# Patient Record
Sex: FEMALE | Race: OTHER | NOT HISPANIC OR LATINO | URBAN - METROPOLITAN AREA
[De-identification: names, ages, dates, MRNs, and addresses within clinical notes are randomized per-mention and may not be internally consistent; named-entity substitution may affect disease eponyms.]

---

## 2023-05-21 ENCOUNTER — EMERGENCY (EMERGENCY)
Facility: HOSPITAL | Age: 23
LOS: 1 days | Discharge: HOME | End: 2023-05-21
Attending: EMERGENCY MEDICINE | Admitting: EMERGENCY MEDICINE
Payer: COMMERCIAL

## 2023-05-21 VITALS
TEMPERATURE: 99 F | RESPIRATION RATE: 20 BRPM | HEART RATE: 101 BPM | OXYGEN SATURATION: 99 % | DIASTOLIC BLOOD PRESSURE: 66 MMHG | SYSTOLIC BLOOD PRESSURE: 111 MMHG

## 2023-05-21 LAB
ALBUMIN SERPL ELPH-MCNC: 4.2 G/DL — SIGNIFICANT CHANGE UP (ref 3.3–5)
ALP SERPL-CCNC: 63 U/L — SIGNIFICANT CHANGE UP (ref 40–120)
ALT FLD-CCNC: 21 U/L — SIGNIFICANT CHANGE UP (ref 4–33)
ANION GAP SERPL CALC-SCNC: 13 MMOL/L — SIGNIFICANT CHANGE UP (ref 7–14)
APPEARANCE UR: CLEAR — SIGNIFICANT CHANGE UP
AST SERPL-CCNC: 21 U/L — SIGNIFICANT CHANGE UP (ref 4–32)
BACTERIA # UR AUTO: NEGATIVE — SIGNIFICANT CHANGE UP
BASOPHILS # BLD AUTO: 0.02 K/UL — SIGNIFICANT CHANGE UP (ref 0–0.2)
BASOPHILS NFR BLD AUTO: 0.3 % — SIGNIFICANT CHANGE UP (ref 0–2)
BILIRUB SERPL-MCNC: <0.2 MG/DL — SIGNIFICANT CHANGE UP (ref 0.2–1.2)
BILIRUB UR-MCNC: NEGATIVE — SIGNIFICANT CHANGE UP
BLOOD GAS VENOUS COMPREHENSIVE RESULT: SIGNIFICANT CHANGE UP
BUN SERPL-MCNC: 10 MG/DL — SIGNIFICANT CHANGE UP (ref 7–23)
CALCIUM SERPL-MCNC: 9.5 MG/DL — SIGNIFICANT CHANGE UP (ref 8.4–10.5)
CHLORIDE SERPL-SCNC: 106 MMOL/L — SIGNIFICANT CHANGE UP (ref 98–107)
CO2 SERPL-SCNC: 19 MMOL/L — LOW (ref 22–31)
COLOR SPEC: YELLOW — SIGNIFICANT CHANGE UP
CREAT SERPL-MCNC: 0.47 MG/DL — LOW (ref 0.5–1.3)
DIFF PNL FLD: NEGATIVE — SIGNIFICANT CHANGE UP
EGFR: 137 ML/MIN/1.73M2 — SIGNIFICANT CHANGE UP
EOSINOPHIL # BLD AUTO: 0.09 K/UL — SIGNIFICANT CHANGE UP (ref 0–0.5)
EOSINOPHIL NFR BLD AUTO: 1.2 % — SIGNIFICANT CHANGE UP (ref 0–6)
EPI CELLS # UR: 7 /HPF — HIGH (ref 0–5)
GLUCOSE SERPL-MCNC: 125 MG/DL — HIGH (ref 70–99)
GLUCOSE UR QL: NEGATIVE — SIGNIFICANT CHANGE UP
HCG SERPL-ACNC: <1 MIU/ML — SIGNIFICANT CHANGE UP
HCT VFR BLD CALC: 35.9 % — SIGNIFICANT CHANGE UP (ref 34.5–45)
HGB BLD-MCNC: 11.4 G/DL — LOW (ref 11.5–15.5)
HYALINE CASTS # UR AUTO: 0 /LPF — SIGNIFICANT CHANGE UP (ref 0–7)
IANC: 5.86 K/UL — SIGNIFICANT CHANGE UP (ref 1.8–7.4)
IMM GRANULOCYTES NFR BLD AUTO: 0.3 % — SIGNIFICANT CHANGE UP (ref 0–0.9)
KETONES UR-MCNC: ABNORMAL
LEUKOCYTE ESTERASE UR-ACNC: ABNORMAL
LIDOCAIN IGE QN: 27 U/L — SIGNIFICANT CHANGE UP (ref 7–60)
LYMPHOCYTES # BLD AUTO: 1.12 K/UL — SIGNIFICANT CHANGE UP (ref 1–3.3)
LYMPHOCYTES # BLD AUTO: 14.8 % — SIGNIFICANT CHANGE UP (ref 13–44)
MCHC RBC-ENTMCNC: 26.6 PG — LOW (ref 27–34)
MCHC RBC-ENTMCNC: 31.8 GM/DL — LOW (ref 32–36)
MCV RBC AUTO: 83.9 FL — SIGNIFICANT CHANGE UP (ref 80–100)
MONOCYTES # BLD AUTO: 0.46 K/UL — SIGNIFICANT CHANGE UP (ref 0–0.9)
MONOCYTES NFR BLD AUTO: 6.1 % — SIGNIFICANT CHANGE UP (ref 2–14)
NEUTROPHILS # BLD AUTO: 5.86 K/UL — SIGNIFICANT CHANGE UP (ref 1.8–7.4)
NEUTROPHILS NFR BLD AUTO: 77.3 % — HIGH (ref 43–77)
NITRITE UR-MCNC: NEGATIVE — SIGNIFICANT CHANGE UP
NRBC # BLD: 0 /100 WBCS — SIGNIFICANT CHANGE UP (ref 0–0)
NRBC # FLD: 0 K/UL — SIGNIFICANT CHANGE UP (ref 0–0)
PH UR: 7.5 — SIGNIFICANT CHANGE UP (ref 5–8)
PLATELET # BLD AUTO: 240 K/UL — SIGNIFICANT CHANGE UP (ref 150–400)
POTASSIUM SERPL-MCNC: 3.7 MMOL/L — SIGNIFICANT CHANGE UP (ref 3.5–5.3)
POTASSIUM SERPL-SCNC: 3.7 MMOL/L — SIGNIFICANT CHANGE UP (ref 3.5–5.3)
PROT SERPL-MCNC: 7.1 G/DL — SIGNIFICANT CHANGE UP (ref 6–8.3)
PROT UR-MCNC: ABNORMAL
RBC # BLD: 4.28 M/UL — SIGNIFICANT CHANGE UP (ref 3.8–5.2)
RBC # FLD: 12.4 % — SIGNIFICANT CHANGE UP (ref 10.3–14.5)
RBC CASTS # UR COMP ASSIST: 2 /HPF — SIGNIFICANT CHANGE UP (ref 0–4)
SODIUM SERPL-SCNC: 138 MMOL/L — SIGNIFICANT CHANGE UP (ref 135–145)
SP GR SPEC: 1.03 — SIGNIFICANT CHANGE UP (ref 1.01–1.05)
UROBILINOGEN FLD QL: SIGNIFICANT CHANGE UP
WBC # BLD: 7.57 K/UL — SIGNIFICANT CHANGE UP (ref 3.8–10.5)
WBC # FLD AUTO: 7.57 K/UL — SIGNIFICANT CHANGE UP (ref 3.8–10.5)
WBC UR QL: 25 /HPF — HIGH (ref 0–5)

## 2023-05-21 PROCEDURE — 99285 EMERGENCY DEPT VISIT HI MDM: CPT

## 2023-05-21 PROCEDURE — 71046 X-RAY EXAM CHEST 2 VIEWS: CPT | Mod: 26

## 2023-05-21 PROCEDURE — 76705 ECHO EXAM OF ABDOMEN: CPT | Mod: 26

## 2023-05-21 RX ORDER — KETOROLAC TROMETHAMINE 30 MG/ML
15 SYRINGE (ML) INJECTION ONCE
Refills: 0 | Status: DISCONTINUED | OUTPATIENT
Start: 2023-05-21 | End: 2023-05-21

## 2023-05-21 RX ORDER — FAMOTIDINE 10 MG/ML
20 INJECTION INTRAVENOUS ONCE
Refills: 0 | Status: COMPLETED | OUTPATIENT
Start: 2023-05-21 | End: 2023-05-21

## 2023-05-21 RX ORDER — ACETAMINOPHEN 500 MG
650 TABLET ORAL ONCE
Refills: 0 | Status: COMPLETED | OUTPATIENT
Start: 2023-05-21 | End: 2023-05-21

## 2023-05-21 RX ORDER — SODIUM CHLORIDE 9 MG/ML
1000 INJECTION INTRAMUSCULAR; INTRAVENOUS; SUBCUTANEOUS ONCE
Refills: 0 | Status: COMPLETED | OUTPATIENT
Start: 2023-05-21 | End: 2023-05-21

## 2023-05-21 RX ORDER — FAMOTIDINE 10 MG/ML
20 INJECTION INTRAVENOUS DAILY
Refills: 0 | Status: DISCONTINUED | OUTPATIENT
Start: 2023-05-21 | End: 2023-05-21

## 2023-05-21 RX ADMIN — Medication 650 MILLIGRAM(S): at 21:08

## 2023-05-21 RX ADMIN — FAMOTIDINE 20 MILLIGRAM(S): 10 INJECTION INTRAVENOUS at 21:08

## 2023-05-21 RX ADMIN — Medication 15 MILLIGRAM(S): at 21:08

## 2023-05-21 RX ADMIN — SODIUM CHLORIDE 1000 MILLILITER(S): 9 INJECTION INTRAMUSCULAR; INTRAVENOUS; SUBCUTANEOUS at 22:30

## 2023-05-21 NOTE — ED PROVIDER NOTE - ATTENDING CONTRIBUTION TO CARE
I performed a face-to-face evaluation of the patient and performed a history and physical examination. I agree with the history and physical examination. If this was a PA visit, I personally saw the patient with the PA and performed a substantive portion of the visit including all aspects of the medical decision making.      Sharp upper abd pain x 1 day, worse w/ food. Similar 2 days ago after eating. Concern for PUD vs. biliary colic. No other GI Sx. No  Sx. Check labs, lipase, RUQ US.

## 2023-05-21 NOTE — ED ADULT TRIAGE NOTE - CHIEF COMPLAINT QUOTE
Pt AOX4 c/o intense sharp abdominal pain 10/10, started  this morning  around 11:00 and has only increased, pt visibly uncomfortable in triage, writhing, moaning, crying, cannot sit in one position, is guarding abdomen, no vomiting, no diarrhea, no surgical history, no PMHx; LMP 5/1/23

## 2023-05-21 NOTE — ED PROVIDER NOTE - PROGRESS NOTE DETAILS
Marisa Mcnulty MD (PGY-1 EM): patient states she feels improved with medicine. clinically looks improved. awaiting US. Marisa Mcnulty MD (PGY-1 EM): patient can tolerate PO. Discussed with patient US results. Awaiting surgery consult. Marisa Mcnulty MD (PGY-1 EM): patient spoke with surgery and decided to follow up with them as an outpatient.

## 2023-05-21 NOTE — ED ADULT NURSE NOTE - NSFALLUNIVINTERV_ED_ALL_ED
Bed/Stretcher in lowest position, wheels locked, appropriate side rails in place/Call bell, personal items and telephone in reach/Instruct patient to call for assistance before getting out of bed/chair/stretcher/Non-slip footwear applied when patient is off stretcher/[place] to call system/Physically safe environment - no spills, clutter or unnecessary equipment/Purposeful proactive rounding/Room/bathroom lighting operational, light cord in reach
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
## 2023-05-21 NOTE — ED PROVIDER NOTE - CLINICAL SUMMARY MEDICAL DECISION MAKING FREE TEXT BOX
Action 1: Continue Detail Level: Simple Continue Regimen: Cimzia 200mg every 2 weeks, Sorilux foam bid Chapo: Sharp upper abd pain x 1 day, worse w/ food. Similar 2 days ago after eating. Concern for PUD vs. biliary colic. No other GI Sx. No  Sx. Check labs, lipase, RUQ US.

## 2023-05-21 NOTE — ED PROVIDER NOTE - NSFOLLOWUPINSTRUCTIONS_ED_ALL_ED_FT
Gallstones    Gallstones (cholelithiasis) is a form of gallbladder disease in which stones form in your gallbladder. The gallbladder is an organ that stores bile made in the liver, which helps digest fats. Gallstones begin as small bile crystals and slowly grow into stones. Gallstone pain occurs when the gallbladder spasms and a gallstone or sludge is blocking the duct. Pain can also occur when a stone passes out of the duct. Only take over-the-counter or prescription medicines for pain, discomfort, or fever as directed by your health care provider. Follow a low-fat diet until seen again by your health care provider.     SEEK IMMEDIATE MEDICAL CARE IF YOU HAVE ANY OF THE FOLLOWING SYMPTOMS: worsening pain, fever, persistent vomiting, yellowing of the skin or eyes, or altered mental status.    you spoke with surgery and decided to follow up with them as an outpatient.     please follow up with Dr. Nuno within the week. Gallstones    Gallstones (cholelithiasis) is a form of gallbladder disease in which stones form in your gallbladder. The gallbladder is an organ that stores bile made in the liver, which helps digest fats. Gallstones begin as small bile crystals and slowly grow into stones. Gallstone pain occurs when the gallbladder spasms and a gallstone or sludge is blocking the duct. Pain can also occur when a stone passes out of the duct. Only take over-the-counter or prescription medicines for pain, discomfort, or fever as directed by your health care provider. Follow a low-fat diet until seen again by your health care provider.     SEEK IMMEDIATE MEDICAL CARE IF YOU HAVE ANY OF THE FOLLOWING SYMPTOMS: worsening pain, fever, persistent vomiting, yellowing of the skin or eyes, or altered mental status.    you spoke with surgery and decided to follow up with them as an outpatient.     please follow up with Dr. Nuno within the week.    Dr. Gerard Nuno  [id number],   [place] [address]  Phone: [phone]

## 2023-05-21 NOTE — ED PROVIDER NOTE - CARE PROVIDER_API CALL
Gerard Nuno)  Surgery  [address] [place] [address]  Phone: [phone]  Fax: [phone]  Follow Up Time:

## 2023-05-21 NOTE — ED PROVIDER NOTE - PATIENT PORTAL LINK FT
You can access the FollowMyHealth Patient Portal offered by [facility] by registering at the following website: [email or web address]. By joining [employer]’s FollowMyHealth portal, you will also be able to view your health information using other applications (apps) compatible with our system.

## 2023-05-21 NOTE — ED ADULT NURSE NOTE - OBJECTIVE STATEMENT
Patient received to room 9. Patient is a&ox4 ambulatory at baseline. Patient admits after eating fatty food, patient had extreme abdominal pain. Patient denies chest painsob n/v. Patient denies any intercourse. No significant past medical history. Patient on palpation has tenderness to upper right quadrant. Patient awaiting for results.

## 2023-05-21 NOTE — ED PROVIDER NOTE - PHYSICAL EXAMINATION
PHYSICAL EXAM:  CONSTITUTIONAL: appearing, awake, alert, oriented to person, place, time/situation and in mild pain, non toxic.   HEAD: Atraumatic  EYES: Clear bilaterally, pupils equal, round and reactive to light.  ENMT: Airway patent, Nasal mucosa clear. Mouth with normal mucosa. Uvula is midline.   CARDIAC: Normal rate, regular rhythm. +S1/S2. No murmurs, rubs or gallops.  RESPIRATORY: Breathing unlabored. Breath sounds clear and equal bilaterally.  ABDOMEN:  Soft, tender RUQ, nondistended. No rebound tenderness or guarding.  NEUROLOGICAL: Alert and oriented, no focal deficits, no motor or sensory deficits. CN2-12 intact. Sensation intact x4 extremities.  SKIN: Skin warm and dry. No evidence of rashes or lesions. PHYSICAL EXAM:  CONSTITUTIONAL: appearing, awake, alert, oriented to person, place, time/situation and in mild pain, non toxic.   HEAD: Atraumatic  EYES: Clear bilaterally, pupils equal, round and reactive to light.  ENMT: Airway patent, Nasal mucosa clear. Mouth with normal mucosa. Uvula is midline.   CARDIAC: Normal rate, regular rhythm. +S1/S2. No murmurs, rubs or gallops.  RESPIRATORY: Breathing unlabored. Breath sounds clear and equal bilaterally.  ABDOMEN:  Soft, tender RUQ, nondistended. No rebound tenderness or guarding.  NEUROLOGICAL: Alert and oriented, no focal deficits, no motor or sensory deficits. CN2-12 intact. Sensation intact x4 extremities.  SKIN: Skin warm and dry. No evidence of rashes or lesions, old linear cuts anterior forearm.

## 2023-05-22 VITALS
TEMPERATURE: 98 F | SYSTOLIC BLOOD PRESSURE: 98 MMHG | OXYGEN SATURATION: 100 % | RESPIRATION RATE: 16 BRPM | HEART RATE: 98 BPM | DIASTOLIC BLOOD PRESSURE: 68 MMHG

## 2023-05-22 LAB — BLOOD GAS VENOUS COMPREHENSIVE RESULT: SIGNIFICANT CHANGE UP

## 2023-05-22 NOTE — ED ADULT NURSE REASSESSMENT NOTE - NS ED NURSE REASSESS COMMENT FT1
Break RN: Pt is A&Ox4, resting in stretcher with no complaints at this time. Respirations even and unlabored, chest rise equal b/l. NSR on cardiac monitor. VS as noted in flow sheets. Pt denies chest pain, SOB, fever, cough, chills, abdominal pain, N/V/D or any urinary symptoms at this time. NAD noted. IV removed. Safety maintained throughout. Will continue to monitor.
Patient A&Ox4, respirations even and unlabored. States improvement in condition. Vitals stable. Results back, awaiting dispo. Stretcher in lowest position, wheels locked, appropriate side rails in place.

## 2023-05-22 NOTE — CONSULT NOTE ADULT - SUBJECTIVE AND OBJECTIVE BOX
GENERAL SURGERY CONSULT NOTE  HPI:        PAST MEDICAL & SURGICAL HISTORY:      MEDICATIONS  (STANDING):    MEDICATIONS  (PRN):  aluminum hydroxide/magnesium hydroxide/simethicone Suspension 30 milliLiter(s) Oral once PRN Dyspepsia      Allergies    No Known Allergies    Intolerances        SOCIAL HISTORY: Denies tobacco, EtOH, other drug use.         PHYSICAL EXAM:  GENERAL: NAD, well-groomed, well-developed  HEAD:  Atraumatic, Normocephalic  EYES: EOMI, conjunctiva and sclera clear  ENMT: Moist mucous membranes  NECK: Supple, No JVD, Normal thyroid  HEART: Regular rate and rhythm  RESPIRATORY: Nonlabored breathing on RA  ABDOMEN: Soft, Nondistended, tender to palpation in RUQ  NEUROLOGY: A&Ox3, nonfocal, moving all extremities  SKIN: warm, dry, normal color, no rash or abnormal lesions        Vital Signs Last 24 Hrs  T(C): 36.3 (22 May 2023 00:08), Max: 37.4 (21 May 2023 20:43)  T(F): 97.3 (22 May 2023 00:08), Max: 99.3 (21 May 2023 20:43)  HR: 96 (22 May 2023 00:08) (88 - 101)  BP: 109/78 (22 May 2023 00:08) (104/88 - 111/66)  BP(mean): --  RR: 16 (22 May 2023 00:08) (16 - 20)  SpO2: 100% (22 May 2023 00:08) (99% - 100%)    Parameters below as of 22 May 2023 00:08  Patient On (Oxygen Delivery Method): room air        I&O's Summary          LABS:                        11.4   7.57  )-----------( 240      ( 21 May 2023 20:32 )             35.9     05-21    138  |  106  |  10  ----------------------------<  125<H>  3.7   |  19<L>  |  0.47<L>    Ca    9.5      21 May 2023 20:32    TPro  7.1  /  Alb  4.2  /  TBili  <0.2  /  DBili  x   /  AST  21  /  ALT  21  /  AlkPhos  63  05-21      Urinalysis Basic - ( 21 May 2023 20:32 )    Color: Yellow / Appearance: Clear / S.032 / pH: x  Gluc: x / Ketone: Trace  / Bili: Negative / Urobili: <2 mg/dL   Blood: x / Protein: 30 mg/dL / Nitrite: Negative   Leuk Esterase: Large / RBC: 2 /HPF / WBC 25 /HPF   Sq Epi: x / Non Sq Epi: x / Bacteria: Negative      CAPILLARY BLOOD GLUCOSE        LIVER FUNCTIONS - ( 21 May 2023 20:32 )  Alb: 4.2 g/dL / Pro: 7.1 g/dL / ALK PHOS: 63 U/L / ALT: 21 U/L / AST: 21 U/L / GGT: x                 RADIOLOGY & ADDITIONAL STUDIES:  < from: US Abdomen Upper Quadrant Right (23 @ 22:33) >  FINDINGS:  Liver: Increased echogenicity.  Bile ducts: Normal caliber. Common bile duct measures 5 mm.  Gallbladder: Cholelithiasis, with a 1.3 cm stone within the gallbladder   neck region, adjacent to the cystic duct. Sonographic Cheek sign cannot   be assessed due to patient receiving pain medication.  Pancreas: Visualized portions are within normal limits.  Right kidney: 9.8 cm. No hydronephrosis.  Ascites: None.  IVC: Visualized portions are within normal limits.    IMPRESSION:  Cholelithiasis with a 1.3 cm stone within the gallbladder neck region,   adjacent to the cystic duct. Sonographic Cheek sign could not be   assessed due to patient receiving pain medication, acute cholecystitis   cannot be excluded.    Hepatic steatosis.    < end of copied text >   GENERAL SURGERY CONSULT NOTE  HPI: 22y F PMH biliary colic presents to the emergency department with sharp upper abdominal pain which started this morning and worsened in severity around 1 PM.  Patient describes the pain as an intermittent sharp cramping and burning which started after she ate fatty foods. Reports she has had prior episodes of similar pain in the past month after eating greasy or fried foods like pizza or french fries. Pain usually alleviated with rest and Tylenol but didnt not improve today prompting presentation to ED. Patient denies fevers, chills, chest pain, SOB, nausea, vomiting, headache, vision changes.  LMP 22      PAST MEDICAL & SURGICAL HISTORY:      MEDICATIONS  (STANDING):    MEDICATIONS  (PRN):  aluminum hydroxide/magnesium hydroxide/simethicone Suspension 30 milliLiter(s) Oral once PRN Dyspepsia      Allergies    No Known Allergies    Intolerances        SOCIAL HISTORY: Denies tobacco, EtOH, other drug use. States she is sexually active and she uses condoms as protection. Patient states she feels safe at home.  Patient lives with her fiancé and small child. Denies any thoughts of hurting herself or anyone else.      PHYSICAL EXAM:  GENERAL: NAD, well-groomed, well-developed  HEAD:  Atraumatic, Normocephalic  EYES: EOMI, conjunctiva and sclera clear  ENMT: Moist mucous membranes  NECK: Supple, No JVD, Normal thyroid  HEART: Regular rate and rhythm  RESPIRATORY: Nonlabored breathing on RA  ABDOMEN: Soft, Nondistended, minimal tenderness to palpation in RUQ  NEUROLOGY: A&Ox3, nonfocal, moving all extremities  SKIN: warm, dry, normal color, well healed prior cutting scars on dorsal forearms        Vital Signs Last 24 Hrs  T(C): 36.3 (22 May 2023 00:08), Max: 37.4 (21 May 2023 20:43)  T(F): 97.3 (22 May 2023 00:08), Max: 99.3 (21 May 2023 20:43)  HR: 96 (22 May 2023 00:08) (88 - 101)  BP: 109/78 (22 May 2023 00:08) (104/88 - 111/66)  BP(mean): --  RR: 16 (22 May 2023 00:08) (16 - 20)  SpO2: 100% (22 May 2023 00:08) (99% - 100%)    Parameters below as of 22 May 2023 00:08  Patient On (Oxygen Delivery Method): room air        I&O's Summary          LABS:                        11.4   7.57  )-----------( 240      ( 21 May 2023 20:32 )             35.9         138  |  106  |  10  ----------------------------<  125<H>  3.7   |  19<L>  |  0.47<L>    Ca    9.5      21 May 2023 20:32    TPro  7.1  /  Alb  4.2  /  TBili  <0.2  /  DBili  x   /  AST    /  ALT    /  AlkPhos  63        Urinalysis Basic - ( 21 May 2023 20:32 )    Color: Yellow / Appearance: Clear / S.032 / pH: x  Gluc: x / Ketone: Trace  / Bili: Negative / Urobili: <2 mg/dL   Blood: x / Protein: 30 mg/dL / Nitrite: Negative   Leuk Esterase: Large / RBC: 2 /HPF / WBC 25 /HPF   Sq Epi: x / Non Sq Epi: x / Bacteria: Negative      CAPILLARY BLOOD GLUCOSE        LIVER FUNCTIONS - ( 21 May 2023 20:32 )  Alb: 4.2 g/dL / Pro: 7.1 g/dL / ALK PHOS: 63 U/L / ALT: 21 U/L / AST: 21 U/L / GGT: x                 RADIOLOGY & ADDITIONAL STUDIES:  < from: US Abdomen Upper Quadrant Right (23 @ 22:33) >  FINDINGS:  Liver: Increased echogenicity.  Bile ducts: Normal caliber. Common bile duct measures 5 mm.  Gallbladder: Cholelithiasis, with a 1.3 cm stone within the gallbladder   neck region, adjacent to the cystic duct. Sonographic Cheek sign cannot   be assessed due to patient receiving pain medication.  Pancreas: Visualized portions are within normal limits.  Right kidney: 9.8 cm. No hydronephrosis.  Ascites: None.  IVC: Visualized portions are within normal limits.    IMPRESSION:  Cholelithiasis with a 1.3 cm stone within the gallbladder neck region,   adjacent to the cystic duct. Sonographic Cheek sign could not be   assessed due to patient receiving pain medication, acute cholecystitis   cannot be excluded.    Hepatic steatosis.    < end of copied text >

## 2023-05-22 NOTE — CONSULT NOTE ADULT - ASSESSMENT
ASSESSMENT/PLAN:      - Outpatient follow-up with Dr. Nuno within 1 week:  Dr. Gerard Nuno  [id number],   [place] [address]  Phone: [phone]      Patient discussed with attending, Dr. Nuno.    Dina Robledo MD  PGY3 Consult Resident  B Team Surgery (Acute Care Surgery)  [id number]   ASSESSMENT/PLAN:  22y F PMH biliary colic presents to the emergency department with sharp upper abdominal pain. In ED, AFVSS. WBC, LFTs wnl. U/S with evidence of cholelithiasis without cholecystitis, ?choledocholithiasis vs. Mirizzi syndrome. Pt recommended to undergo inpatient admission for cholecystectomy, however deferred due to lack of childcare for baby at home. Pt agreeable to outpatient follow-up for cholecystectomy.     - PO challenge and dispo per ED  - Short interval outpatient follow-up with Dr. Nuno within 1 week:  Dr. Gerard Nuno  [id number],   [place] [address]  Phone: [phone]  - Recommend repeat lactate after IV hydration prior to d/c      Patient discussed with attending, Dr. Nuno.    Dina Robledo MD  PGY3 Consult Resident  B Team Surgery (Acute Care Surgery)  [id number]